# Patient Record
Sex: FEMALE | Race: WHITE | NOT HISPANIC OR LATINO | Employment: OTHER | URBAN - METROPOLITAN AREA
[De-identification: names, ages, dates, MRNs, and addresses within clinical notes are randomized per-mention and may not be internally consistent; named-entity substitution may affect disease eponyms.]

---

## 2017-03-14 ENCOUNTER — PROCEDURE VISIT (OUTPATIENT)
Dept: OBSTETRICS AND GYNECOLOGY | Facility: CLINIC | Age: 72
End: 2017-03-14

## 2017-03-14 ENCOUNTER — OFFICE VISIT (OUTPATIENT)
Dept: OBSTETRICS AND GYNECOLOGY | Facility: CLINIC | Age: 72
End: 2017-03-14

## 2017-03-14 VITALS
HEIGHT: 63 IN | DIASTOLIC BLOOD PRESSURE: 70 MMHG | SYSTOLIC BLOOD PRESSURE: 118 MMHG | WEIGHT: 141 LBS | BODY MASS INDEX: 24.98 KG/M2

## 2017-03-14 DIAGNOSIS — Z01.419 WELL WOMAN EXAM WITH ROUTINE GYNECOLOGICAL EXAM: Primary | ICD-10-CM

## 2017-03-14 DIAGNOSIS — Z78.0 POSTMENOPAUSAL: ICD-10-CM

## 2017-03-14 DIAGNOSIS — M85.80 OSTEOPENIA: ICD-10-CM

## 2017-03-14 DIAGNOSIS — Z13.820 SCREENING FOR OSTEOPOROSIS: Primary | ICD-10-CM

## 2017-03-14 PROCEDURE — G0101 CA SCREEN;PELVIC/BREAST EXAM: HCPCS | Performed by: OBSTETRICS & GYNECOLOGY

## 2017-03-14 PROCEDURE — 77080 DXA BONE DENSITY AXIAL: CPT | Performed by: OBSTETRICS & GYNECOLOGY

## 2017-03-14 RX ORDER — UBIDECARENONE 60 MG
CAPSULE ORAL
COMMUNITY

## 2017-03-14 RX ORDER — CETIRIZINE HYDROCHLORIDE 10 MG/1
10 TABLET ORAL
COMMUNITY

## 2017-03-14 RX ORDER — CHOLECALCIFEROL (VITAMIN D3) 25 MCG
TABLET,CHEWABLE ORAL
COMMUNITY

## 2017-03-14 NOTE — PROGRESS NOTES
Subjective   Bhavana Carcamo is a 72 y.o. female is here today as a self referral. Here for annual.    History of Present Illness-here today for annual exam and checkup.    The following portions of the patient's history were reviewed and updated as appropriate: allergies, current medications, past family history, past medical history, past social history, past surgical history and problem list.    Review of Systems   Constitutional: Negative.    HENT: Negative.    Eyes: Negative.    Respiratory: Negative.    Cardiovascular: Negative.    Gastrointestinal: Negative.    Endocrine: Negative.    Genitourinary: Negative.    Musculoskeletal: Negative.    Skin: Negative.    Allergic/Immunologic: Negative.    Neurological: Negative.    Hematological: Negative.    Psychiatric/Behavioral: Negative.        Objective   Physical Exam   Constitutional: She is oriented to person, place, and time. She appears well-developed and well-nourished.   HENT:   Head: Normocephalic and atraumatic.   Nose: Nose normal.   Eyes: Conjunctivae and EOM are normal. Pupils are equal, round, and reactive to light.   Neck: Normal range of motion. Neck supple. No thyromegaly present.   Cardiovascular: Normal rate, regular rhythm, normal heart sounds and intact distal pulses.  Exam reveals no gallop.    No murmur heard.  Pulmonary/Chest: Effort normal and breath sounds normal. No respiratory distress. She has no wheezes. She exhibits no mass, no tenderness, no swelling and no retraction. Right breast exhibits no inverted nipple, no mass, no nipple discharge, no skin change and no tenderness. Left breast exhibits no inverted nipple, no mass, no nipple discharge, no skin change and no tenderness.   Abdominal: Soft. Bowel sounds are normal. She exhibits no distension and no mass. There is no tenderness.   Genitourinary: Rectum normal, vagina normal and uterus normal. There is no rash, tenderness, lesion or injury on the right labia. There is no rash,  tenderness, lesion or injury on the left labia. Uterus is not enlarged and not tender. Cervix exhibits no motion tenderness and no discharge. Right adnexum displays no mass, no tenderness and no fullness. Left adnexum displays no mass, no tenderness and no fullness.   Musculoskeletal: Normal range of motion. She exhibits no edema, tenderness or deformity.   Neurological: She is alert and oriented to person, place, and time.   Skin: Skin is warm and dry.   Psychiatric: She has a normal mood and affect. Her behavior is normal. Judgment and thought content normal.   Nursing note and vitals reviewed.        Assessment/Plan   Problems Addressed this Visit     None      Visit Diagnoses     Well woman exam with routine gynecological exam    -  Primary        Mammogram today and DEXA scan with -1.9 on her hip.  Patient has used steroids in the past for injections for her left knee.  Colonoscopy is current.

## 2018-09-11 ENCOUNTER — OFFICE VISIT (OUTPATIENT)
Dept: GASTROENTEROLOGY | Facility: CLINIC | Age: 73
End: 2018-09-11

## 2018-09-11 VITALS — HEIGHT: 63 IN | HEART RATE: 69 BPM | SYSTOLIC BLOOD PRESSURE: 100 MMHG | DIASTOLIC BLOOD PRESSURE: 75 MMHG

## 2018-09-11 DIAGNOSIS — R10.13 DYSPEPSIA: ICD-10-CM

## 2018-09-11 DIAGNOSIS — D12.6 ADENOMATOUS POLYP OF COLON, UNSPECIFIED PART OF COLON: ICD-10-CM

## 2018-09-11 DIAGNOSIS — Z83.71 FAMILY HISTORY OF POLYPS IN THE COLON: ICD-10-CM

## 2018-09-11 DIAGNOSIS — K21.9 GASTROESOPHAGEAL REFLUX DISEASE, ESOPHAGITIS PRESENCE NOT SPECIFIED: Primary | ICD-10-CM

## 2018-09-11 PROCEDURE — 99214 OFFICE O/P EST MOD 30 MIN: CPT | Performed by: INTERNAL MEDICINE

## 2018-09-11 NOTE — PROGRESS NOTES
PATIENT INFORMATION  Bhavana Carcamo       - 1945    CHIEF COMPLAINT  Chief Complaint   Patient presents with   • Heartburn       HISTORY OF PRESENT ILLNESS  Has been on nexium for years and then did well off and but resarted  Omeprazole but tried to wean down but is replacing it with Beano.   No previous EGD but does better with PPI. Istaking 20mgonce aday with the Nexium            REVIEW OF SYSTEMS  Review of Systems   Gastrointestinal:        Reflux   All other systems reviewed and are negative.        ACTIVE PROBLEMS  Patient Active Problem List    Diagnosis   • Anxiety [F41.9]   • Allergy [T78.40XA]   • GERD (gastroesophageal reflux disease) [K21.9]   • Hip pain [M25.559]   • Hyperlipidemia [E78.5]   • Knee pain [M25.569]   • Low back pain [M54.5]     Overview Note:     right     • Neck pain [M54.2]   • Urinary frequency [R35.0]         PAST MEDICAL HISTORY  Past Medical History:   Diagnosis Date   • Allergy    • Colon polyp    • GERD (gastroesophageal reflux disease)    • H/O complete eye exam DUE   • Hip pain    • Hyperlipidemia    • Knee pain    • Low back pain     right   • Neck pain    • Osteoarthritis    • Scoliosis (and kyphoscoliosis), idiopathic    • Urinary frequency          SURGICAL HISTORY  Past Surgical History:   Procedure Laterality Date   • COLONOSCOPY  10/2010   • KNEE SURGERY  2012   • MAMMO BILATERAL  2014    dr odom    • PAP SMEAR  2014    dr odom    • TONSILLECTOMY     • TUBAL ABDOMINAL LIGATION           FAMILY HISTORY  Family History   Problem Relation Age of Onset   • Parkinsonism Maternal Grandfather    • Cancer Other         aunt-breast   • Breast cancer Paternal Aunt    • Colon cancer Neg Hx    • Colon polyps Neg Hx          SOCIAL HISTORY  Social History     Occupational History   • Not on file.     Social History Main Topics   • Smoking status: Never Smoker   • Smokeless tobacco: Not on file   • Alcohol use Yes      Comment: occasional   • Drug use:  "Unknown   • Sexual activity: Not on file         CURRENT MEDICATIONS    Current Outpatient Prescriptions:   •  ALPRAZolam (XANAX) 0.25 MG tablet, Take 1 tablet by mouth daily as needed for anxiety., Disp: 20 tablet, Rfl: 0  •  ascorbic acid with jose luis hips 1000 MG tablet, Take 1,000 mg by mouth., Disp: , Rfl:   •  cetirizine (ZyrTEC) 10 MG tablet, Take 10 mg by mouth daily., Disp: , Rfl:   •  cetirizine (zyrTEC) 10 MG tablet, Take 10 mg by mouth., Disp: , Rfl:   •  Coenzyme Q10 60 MG capsule, Take  by mouth., Disp: , Rfl:   •  Cyanocobalamin (B-12) 1000 MCG capsule, Take  by mouth., Disp: , Rfl:   •  Fish Oil-Cholecalciferol (FISH OIL + D3) 6694-8572 MG-UNIT capsule, Take 2 g by mouth., Disp: , Rfl:   •  Multiple Vitamin (MULTIVITAMIN) tablet, Take 1 tablet by mouth daily., Disp: , Rfl:   •  MULTIPLE VITAMINS-MINERALS ER PO, Take  by mouth., Disp: , Rfl:   •  PROBIOTIC PRODUCT PO, Take  by mouth., Disp: , Rfl:   •  Vitamin C-Vitamin E-Panthenol (VITAMIN E & C BEAUTY LOTION EX), Take 800 Units by mouth., Disp: , Rfl:   •  VITAMIN K, PHYTONADIONE, PO, Take 100 mcg by mouth., Disp: , Rfl:     ALLERGIES  Patient has no known allergies.    VITALS  Vitals:    09/11/18 1113   BP: 100/75   Pulse: 69   Height: 160 cm (62.99\")       LAST RESULTS   Abstract on 12/14/2015   Component Date Value Ref Range Status   •  Mammogram 01/01/2013 results unavailable   Final   •  Pap smear 01/01/2013 results unavailable   Final   •  Colonoscopy 10/15/2010 polyps   Final   •  EYE EXAM 01/01/2012 with dilation   Final   •  Dexa Scan 01/01/2013 results unavailable   Final     No results found.    PHYSICAL EXAM  Physical Exam   Constitutional: She is oriented to person, place, and time. She appears well-developed and well-nourished.   HENT:   Head: Normocephalic and atraumatic.   Eyes: Pupils are equal, round, and reactive to light. Conjunctivae and EOM are normal. No scleral icterus.   Neck: Normal range of motion. Neck supple. No " thyromegaly present.   Cardiovascular: Normal rate, regular rhythm, normal heart sounds and intact distal pulses.  Exam reveals no gallop.    No murmur heard.  Pulmonary/Chest: Effort normal and breath sounds normal. She has no wheezes. She has no rales.   Abdominal: Soft. Bowel sounds are normal. She exhibits no shifting dullness, no distension, no fluid wave, no abdominal bruit, no ascites and no mass. There is no hepatosplenomegaly. There is tenderness in the right upper quadrant, right lower quadrant, epigastric area and periumbilical area. There is no guarding and negative Clark's sign. Hernia confirmed negative in the ventral area.   Musculoskeletal: Normal range of motion. She exhibits no edema.   Lymphadenopathy:     She has no cervical adenopathy.   Neurological: She is alert and oriented to person, place, and time.   Skin: Skin is warm and dry. No rash noted. She is not diaphoretic. No erythema.   Psychiatric: She has a normal mood and affect.       ASSESSMENT  Diagnoses and all orders for this visit:    Gastroesophageal reflux disease, esophagitis presence not specified  -     External Facility Surgical / Procedural Request; Future    Dyspepsia  -     External Facility Surgical / Procedural Request; Future    Family history of polyps in the colon    Adenomatous polyp of colon, unspecified part of colon  -     External Facility Surgical / Procedural Request; Future    Other orders  -     Obtain informed consent; Future          PLAN  Return in about 3 months (around 12/11/2018).

## 2018-09-20 ENCOUNTER — TELEPHONE (OUTPATIENT)
Dept: GASTROENTEROLOGY | Facility: CLINIC | Age: 73
End: 2018-09-20

## 2018-09-20 NOTE — TELEPHONE ENCOUNTER
----- Message from Jaun Vila MA sent at 9/11/2018 11:46 AM EDT -----  Regarding: egd & c/s  franciscoasse call and schedule double @ [facility]

## 2018-10-22 ENCOUNTER — OUTSIDE FACILITY SERVICE (OUTPATIENT)
Dept: GASTROENTEROLOGY | Facility: CLINIC | Age: 73
End: 2018-10-22

## 2018-10-22 ENCOUNTER — LAB REQUISITION (OUTPATIENT)
Dept: LAB | Facility: HOSPITAL | Age: 73
End: 2018-10-22

## 2018-10-22 DIAGNOSIS — R10.13 EPIGASTRIC PAIN: ICD-10-CM

## 2018-10-22 PROCEDURE — 45380 COLONOSCOPY AND BIOPSY: CPT | Performed by: INTERNAL MEDICINE

## 2018-10-22 PROCEDURE — 88305 TISSUE EXAM BY PATHOLOGIST: CPT | Performed by: INTERNAL MEDICINE

## 2018-10-22 PROCEDURE — 88312 SPECIAL STAINS GROUP 1: CPT | Performed by: INTERNAL MEDICINE

## 2018-10-22 PROCEDURE — 45385 COLONOSCOPY W/LESION REMOVAL: CPT | Performed by: INTERNAL MEDICINE

## 2018-10-22 PROCEDURE — 43239 EGD BIOPSY SINGLE/MULTIPLE: CPT | Performed by: INTERNAL MEDICINE

## 2018-10-23 LAB
CYTO UR: NORMAL
LAB AP CASE REPORT: NORMAL
LAB AP CLINICAL INFORMATION: NORMAL
PATH REPORT.FINAL DX SPEC: NORMAL
PATH REPORT.GROSS SPEC: NORMAL

## 2019-03-19 ENCOUNTER — PROCEDURE VISIT (OUTPATIENT)
Dept: OBSTETRICS AND GYNECOLOGY | Facility: CLINIC | Age: 74
End: 2019-03-19

## 2019-03-19 ENCOUNTER — APPOINTMENT (OUTPATIENT)
Dept: WOMENS IMAGING | Facility: HOSPITAL | Age: 74
End: 2019-03-19

## 2019-03-19 DIAGNOSIS — Z78.0 POST-MENOPAUSAL: Primary | ICD-10-CM

## 2019-03-19 DIAGNOSIS — Z12.31 VISIT FOR SCREENING MAMMOGRAM: Primary | ICD-10-CM

## 2019-03-19 PROCEDURE — 77067 SCR MAMMO BI INCL CAD: CPT | Performed by: OBSTETRICS & GYNECOLOGY

## 2019-03-19 PROCEDURE — 77080 DXA BONE DENSITY AXIAL: CPT | Performed by: OBSTETRICS & GYNECOLOGY

## 2019-03-19 PROCEDURE — 77067 SCR MAMMO BI INCL CAD: CPT | Performed by: RADIOLOGY

## 2020-01-14 ENCOUNTER — LAB REQUISITION (OUTPATIENT)
Dept: LAB | Facility: HOSPITAL | Age: 75
End: 2020-01-14

## 2020-01-14 DIAGNOSIS — Z00.00 ENCOUNTER FOR GENERAL ADULT MEDICAL EXAMINATION WITHOUT ABNORMAL FINDINGS: ICD-10-CM

## 2020-01-14 PROCEDURE — 87070 CULTURE OTHR SPECIMN AEROBIC: CPT | Performed by: SURGERY

## 2020-01-14 PROCEDURE — 87205 SMEAR GRAM STAIN: CPT | Performed by: SURGERY

## 2020-01-17 LAB
BACTERIA SPEC AEROBE CULT: NORMAL
GRAM STN SPEC: NORMAL
GRAM STN SPEC: NORMAL

## 2021-04-12 ENCOUNTER — TELEPHONE (OUTPATIENT)
Dept: GASTROENTEROLOGY | Facility: CLINIC | Age: 76
End: 2021-04-12

## 2021-04-15 ENCOUNTER — PREP FOR SURGERY (OUTPATIENT)
Dept: SURGERY | Facility: SURGERY CENTER | Age: 76
End: 2021-04-15

## 2021-04-15 DIAGNOSIS — K55.20 CECAL ANGIODYSPLASIA: ICD-10-CM

## 2021-04-15 DIAGNOSIS — Z86.010 PERSONAL HISTORY OF COLONIC POLYPS: ICD-10-CM

## 2021-04-15 DIAGNOSIS — Z12.11 ENCOUNTER FOR SCREENING FOR MALIGNANT NEOPLASM OF COLON: Primary | ICD-10-CM

## 2021-04-15 NOTE — TELEPHONE ENCOUNTER
Colonoscopy [place] ordered,  Will you please add request for APC for procedure pt has cecal angiodysplasia.  Thanks

## 2021-04-16 ENCOUNTER — TRANSCRIBE ORDERS (OUTPATIENT)
Dept: LAB | Facility: SURGERY CENTER | Age: 76
End: 2021-04-16

## 2021-04-16 DIAGNOSIS — Z01.818 OTHER SPECIFIED PRE-OPERATIVE EXAMINATION: Primary | ICD-10-CM

## 2021-04-16 PROBLEM — Z86.010 PERSONAL HISTORY OF COLONIC POLYPS: Status: ACTIVE | Noted: 2021-04-16

## 2021-04-16 PROBLEM — Z86.0100 PERSONAL HISTORY OF COLONIC POLYPS: Status: ACTIVE | Noted: 2021-04-16

## 2021-04-16 PROBLEM — Z12.11 ENCOUNTER FOR SCREENING FOR MALIGNANT NEOPLASM OF COLON: Status: ACTIVE | Noted: 2021-04-16

## 2021-04-16 NOTE — TELEPHONE ENCOUNTER
CALLED AND SPOKE WITH PATIENT.  SCHEDULED AT [facility] ON 10/26/2021 AT 12:30PM - ARRIVE 11:30AM.  WILL MAIL INSTRUCTIONS WITH DIRECTIONS.    COVID TEST ON 10/23/2021 ARRIVE 8AM AT [facility].  NEED TO SELF QUARANTINE UNTIL AFTER PROCEDURE.  SHE UNDERSTANDS.

## 2021-10-19 DIAGNOSIS — Z01.818 OTHER SPECIFIED PRE-OPERATIVE EXAMINATION: Primary | ICD-10-CM

## 2021-10-21 RX ORDER — FLUOXETINE 10 MG/1
10 CAPSULE ORAL DAILY
COMMUNITY

## 2021-10-26 ENCOUNTER — HOSPITAL ENCOUNTER (OUTPATIENT)
Facility: SURGERY CENTER | Age: 76
Setting detail: HOSPITAL OUTPATIENT SURGERY
Discharge: HOME | End: 2021-10-26
Attending: INTERNAL MEDICINE | Admitting: INTERNAL MEDICINE

## 2021-10-26 ENCOUNTER — ANESTHESIA EVENT (OUTPATIENT)
Dept: SURGERY | Facility: SURGERY CENTER | Age: 76
End: 2021-10-26

## 2021-10-26 ENCOUNTER — ANESTHESIA (OUTPATIENT)
Dept: SURGERY | Facility: SURGERY CENTER | Age: 76
End: 2021-10-26

## 2021-10-26 VITALS
HEART RATE: 61 BPM | BODY MASS INDEX: 27.11 KG/M2 | OXYGEN SATURATION: 99 % | TEMPERATURE: 98.7 F | HEIGHT: 63 IN | RESPIRATION RATE: 16 BRPM | DIASTOLIC BLOOD PRESSURE: 68 MMHG | SYSTOLIC BLOOD PRESSURE: 113 MMHG | WEIGHT: 153 LBS

## 2021-10-26 DIAGNOSIS — Z86.010 PERSONAL HISTORY OF COLONIC POLYPS: ICD-10-CM

## 2021-10-26 DIAGNOSIS — Z12.11 ENCOUNTER FOR SCREENING FOR MALIGNANT NEOPLASM OF COLON: ICD-10-CM

## 2021-10-26 PROCEDURE — 45380 COLONOSCOPY AND BIOPSY: CPT | Performed by: INTERNAL MEDICINE

## 2021-10-26 PROCEDURE — 0DBK8ZX EXCISION OF ASCENDING COLON, VIA NATURAL OR ARTIFICIAL OPENING ENDOSCOPIC, DIAGNOSTIC: ICD-10-PCS | Performed by: INTERNAL MEDICINE

## 2021-10-26 PROCEDURE — 25010000002 PROPOFOL 10 MG/ML EMULSION: Performed by: ANESTHESIOLOGY

## 2021-10-26 PROCEDURE — 88305 TISSUE EXAM BY PATHOLOGIST: CPT | Performed by: INTERNAL MEDICINE

## 2021-10-26 RX ORDER — PROPOFOL 10 MG/ML
VIAL (ML) INTRAVENOUS AS NEEDED
Status: DISCONTINUED | OUTPATIENT
Start: 2021-10-26 | End: 2021-10-26 | Stop reason: SURG

## 2021-10-26 RX ORDER — FOLIC ACID 1 MG/1
1 TABLET ORAL DAILY
COMMUNITY

## 2021-10-26 RX ORDER — SODIUM CHLORIDE, SODIUM LACTATE, POTASSIUM CHLORIDE, CALCIUM CHLORIDE 600; 310; 30; 20 MG/100ML; MG/100ML; MG/100ML; MG/100ML
1000 INJECTION, SOLUTION INTRAVENOUS CONTINUOUS
Status: DISCONTINUED | OUTPATIENT
Start: 2021-10-26 | End: 2021-10-26 | Stop reason: HOSPADM

## 2021-10-26 RX ORDER — LIDOCAINE HYDROCHLORIDE 10 MG/ML
0.5 INJECTION, SOLUTION INFILTRATION; PERINEURAL ONCE AS NEEDED
Status: DISCONTINUED | OUTPATIENT
Start: 2021-10-26 | End: 2021-10-26 | Stop reason: HOSPADM

## 2021-10-26 RX ORDER — LIDOCAINE HYDROCHLORIDE 20 MG/ML
INJECTION, SOLUTION INFILTRATION; PERINEURAL AS NEEDED
Status: DISCONTINUED | OUTPATIENT
Start: 2021-10-26 | End: 2021-10-26 | Stop reason: SURG

## 2021-10-26 RX ADMIN — PROPOFOL 120 MG: 10 INJECTION, EMULSION INTRAVENOUS at 13:34

## 2021-10-26 RX ADMIN — PROPOFOL 200 MCG/KG/MIN: 10 INJECTION, EMULSION INTRAVENOUS at 13:34

## 2021-10-26 RX ADMIN — LIDOCAINE HYDROCHLORIDE 60 MG: 20 INJECTION, SOLUTION INFILTRATION; PERINEURAL at 13:34

## 2021-10-26 RX ADMIN — SODIUM CHLORIDE, POTASSIUM CHLORIDE, SODIUM LACTATE AND CALCIUM CHLORIDE 1000 ML: 600; 310; 30; 20 INJECTION, SOLUTION INTRAVENOUS at 12:04

## 2021-10-26 NOTE — H&P
Patient Care Team:  Sherwin Pitts MD as PCP - General (Internal Medicine)    CHIEF COMPLAINT: Personal hx colon polyps    HISTORY OF PRESENT ILLNESS:  Last exam was 2018    Past Medical History:   Diagnosis Date   • Allergy    • Colon polyp    • GERD (gastroesophageal reflux disease)    • H/O complete eye exam DUE   • Hip pain    • Hyperlipidemia    • Knee pain    • Low back pain     right   • Neck pain    • Osteoarthritis    • Scoliosis (and kyphoscoliosis), idiopathic    • Urinary frequency      Past Surgical History:   Procedure Laterality Date   • COLONOSCOPY  10/2010   • JOINT REPLACEMENT Left     Knee 2 times   • KNEE SURGERY  02/2012   • MAMMO BILATERAL  06/2014    dr odom    • PAP SMEAR  06/2014    dr odom    • TONSILLECTOMY     • TOTAL HIP ARTHROPLASTY Bilateral    • TUBAL ABDOMINAL LIGATION       Family History   Problem Relation Age of Onset   • Parkinsonism Maternal Grandfather    • Cancer Other         aunt-breast   • Breast cancer Paternal Aunt    • Colon cancer Neg Hx    • Colon polyps Neg Hx      Social History     Tobacco Use   • Smoking status: Never Smoker   • Smokeless tobacco: Not on file   Substance Use Topics   • Alcohol use: Yes     Comment: occasional   • Drug use: Not on file     Medications Prior to Admission   Medication Sig Dispense Refill Last Dose   • ascorbic acid with jose luis hips 1000 MG tablet Take 1,000 mg by mouth.   10/25/2021 at Unknown time   • cetirizine (ZyrTEC) 10 MG tablet Take 10 mg by mouth daily.   10/25/2021 at Unknown time   • Coenzyme Q10 60 MG capsule Take  by mouth.   10/25/2021 at Unknown time   • Cyanocobalamin (B-12) 1000 MCG capsule Take  by mouth.   10/25/2021 at Unknown time   • Fish Oil-Cholecalciferol (FISH OIL + D3) 9547-7755 MG-UNIT capsule Take 2 g by mouth.   10/25/2021 at Unknown time   • FLUoxetine (PROzac) 10 MG capsule Take 10 mg by mouth Daily.   10/25/2021 at Unknown time   • folic acid (FOLVITE) 1 MG tablet Take 1 mg by mouth Daily.    "10/25/2021 at Unknown time   • Multiple Vitamin (MULTIVITAMIN) tablet Take 1 tablet by mouth daily.   10/25/2021 at Unknown time   • PROBIOTIC PRODUCT PO Take  by mouth.   10/25/2021 at Unknown time   • TURMERIC PO Take  by mouth Daily.   10/25/2021 at Unknown time   • Vitamin C-Vitamin E-Panthenol (VITAMIN E & C BEAUTY LOTION EX) Take 800 Units by mouth.   10/25/2021 at Unknown time   • VITAMIN K, PHYTONADIONE, PO Take 100 mcg by mouth.   10/25/2021 at Unknown time   • ALPRAZolam (XANAX) 0.25 MG tablet Take 1 tablet by mouth daily as needed for anxiety. 20 tablet 0    • cetirizine (zyrTEC) 10 MG tablet Take 10 mg by mouth.      • MULTIPLE VITAMINS-MINERALS ER PO Take  by mouth.        Allergies:  Patient has no known allergies.    REVIEW OF SYSTEMS:  Please see the above history of present illness for pertinent positives and negatives.  The remainder of the patient's systems have been reviewed and are negative.     Vital Signs  Temp:  [97.1 °F (36.2 °C)] 97.1 °F (36.2 °C)  Heart Rate:  [64] 64  Resp:  [16] 16  BP: (131)/(68) 131/68    Flowsheet Rows      First Filed Value   Admission Height 160 cm (63\") Documented at 10/21/2021 1150   Admission Weight 68 kg (150 lb) Documented at 10/21/2021 1150           Physical Exam:  Physical Exam   Constitutional: Patient appears well-developed and well-nourished and in no acute distress   HEENT:   Head: Normocephalic and atraumatic.   Eyes:  Pupils are equal, round, and reactive to light. EOM are intact. Sclerae are anicteric and non-injected.  Mouth and Throat: Patient has moist mucous membranes. Oropharynx is clear of any erythema or exudate.     Neck: Neck supple. No JVD present. No thyromegaly present. No lymphadenopathy present.  Cardiovascular: Regular rate, regular rhythm, S1 normal and S2 normal.  Exam reveals no gallop and no friction rub.  No murmur heard.  Pulmonary/Chest: Lungs are clear to auscultation bilaterally. No respiratory distress. No wheezes. No rhonchi. " No rales.   Abdominal: Soft. Bowel sounds are normal. No distension and no mass. There is no hepatosplenomegaly. There is no tenderness.   Musculoskeletal: Normal Muscle tone  Extremities: No edema. Pulses are palpable in all 4 extremities.  Neurological: Patient is alert and oriented to person, place, and time. Cranial nerves II-XII are grossly intact with no focal deficits.  Skin: Skin is warm. No rash noted. Nails show no clubbing.  No cyanosis or erythema.    Debilities/Disabilities Identified: None  Emotional Behavior: Appropriate     Results Review:   I reviewed the patient's new clinical results.    Lab Results (most recent)     None          Imaging Results (Most Recent)     None        reviewed    ECG/EMG Results (most recent)     None        reviewed    Assessment/Plan   Personal hx colon polyps/  colonoscopy      I discussed the patient's findings and my recommendations with patient.     Solo Gutierrez MD  10/26/21  13:00 EDT    Time: 10 min prior to procedure.

## 2021-10-26 NOTE — ANESTHESIA PREPROCEDURE EVALUATION
Anesthesia Evaluation     Patient summary reviewed   no history of anesthetic complications:  NPO Solid Status: > 8 hours  NPO Liquid Status: > 2 hours           Airway   Mallampati: II  TM distance: >3 FB  Neck ROM: full  No difficulty expected  Dental      Pulmonary     breath sounds clear to auscultation  (-) shortness of breath, recent URI, not a smoker  Cardiovascular     Rhythm: regular  Rate: normal    (+) hyperlipidemia,   (-) past MI, dysrhythmias, angina      Neuro/Psych  (+) psychiatric history Anxiety,     (-) seizures, CVA  GI/Hepatic/Renal/Endo    (+)  GERD,    (-)  obesity, liver disease, no renal disease, diabetes    Musculoskeletal     (-) neck stiffness  Abdominal    Substance History      OB/GYN          Other   arthritis,    history of cancer (CLL)                  Anesthesia Plan    ASA 2     MAC       Anesthetic plan, all risks, benefits, and alternatives have been provided, discussed and informed consent has been obtained with: patient.

## 2021-10-26 NOTE — DISCHARGE INSTRUCTIONS
Colonoscopy, Adult, Care After  This sheet gives you information about how to care for yourself after your procedure. Your doctor may also give you more specific instructions. If you have problems or questions, call your doctor.  What can I expect after the procedure?  After the procedure, it is common to have:  · A small amount of blood in your poop (stool) for 24 hours.  · Some gas.  · Mild cramping or bloating in your belly (abdomen).  Follow these instructions at home:  Eating and drinking    · Drink enough fluid to keep your pee (urine) pale yellow.  · Follow instructions from your doctor about what you cannot eat or drink.  · Return to your normal diet as told by your doctor. Avoid heavy or fried foods that are hard to digest.    Activity  · Rest as told by your doctor.  · Do not sit for a long time without moving. Get up to take short walks every 1-2 hours. This is important. Ask for help if you feel weak or unsteady.  · Return to your normal activities as told by your doctor. Ask your doctor what activities are safe for you.  To help cramping and bloating:    · Try walking around.  · Put heat on your belly as told by your doctor. Use the heat source that your doctor recommends, such as a moist heat pack or a heating pad.  ? Put a towel between your skin and the heat source.  ? Leave the heat on for 20-30 minutes.  ? Remove the heat if your skin turns bright red. This is very important if you are unable to feel pain, heat, or cold. You may have a greater risk of getting burned.    General instructions  · If you were given a medicine to help you relax (sedative) during your procedure, it can affect you for many hours. Do not drive or use machinery until your doctor says that it is safe.  · For the first 24 hours after the procedure:  ? Do not sign important documents.  ? Do not drink alcohol.  ? Do your daily activities more slowly than normal.  ? Eat foods that are soft and easy to digest.  · Take  over-the-counter or prescription medicines only as told by your doctor.  · Keep all follow-up visits as told by your doctor. This is important.  Contact a doctor if:  · You have blood in your poop 2-3 days after the procedure.  Get help right away if:  · You have more than a small amount of blood in your poop.  · You see large clumps of tissue (blood clots) in your poop.  · Your belly is swollen.  · You feel like you may vomit (nauseous).  · You vomit.  · You have a fever.  · You have belly pain that gets worse, and medicine does not help your pain.  Summary  · After the procedure, it is common to have a small amount of blood in your poop. You may also have mild cramping and bloating in your belly.  · If you were given a medicine to help you relax (sedative) during your procedure, it can affect you for many hours. Do not drive or use machinery until your doctor says that it is safe.  · Get help right away if you have a lot of blood in your poop, feel like you may vomit, have a fever, or have more belly pain.  This information is not intended to replace advice given to you by your health care provider. Make sure you discuss any questions you have with your health care provider.  Document Revised: 10/23/2020 Document Reviewed: 07/13/2020  [employer] Patient Education © 2021 [employer]  Monitored Anesthesia Care, Care After  This sheet gives you information about how to care for yourself after your procedure. Your health care provider may also give you more specific instructions. If you have problems or questions, contact your health care provider.  What can I expect after the procedure?  After the procedure, it is common to have:  · Tiredness.  · Forgetfulness about what happened after the procedure.  · Impaired judgment for important decisions.  · Nausea or vomiting.  · Some difficulty with balance.  Follow these instructions at home:  For the time period you were told by your health care provider:         · Rest as  needed.  · Do not participate in activities where you could fall or become injured.  · Do not drive or use machinery.  · Do not drink alcohol.  · Do not take sleeping pills or medicines that cause drowsiness.  · Do not make important decisions or sign legal documents.  · Do not take care of children on your own.  Eating and drinking  · Follow the diet that is recommended by your health care provider.  · Drink enough fluid to keep your urine pale yellow.  · If you vomit:  ? Drink water, juice, or soup when you can drink without vomiting.  ? Make sure you have little or no nausea before eating solid foods.  General instructions  · Have a responsible adult stay with you for the time you are told. It is important to have someone help care for you until you are awake and alert.  · Take over-the-counter and prescription medicines only as told by your health care provider.  · If you have sleep apnea, surgery and certain medicines can increase your risk for breathing problems. Follow instructions from your health care provider about wearing your sleep device:  ? Anytime you are sleeping, including during daytime naps.  ? While taking prescription pain medicines, sleeping medicines, or medicines that make you drowsy.  · Avoid smoking.  · Keep all follow-up visits as told by your health care provider. This is important.  Contact a health care provider if:  · You keep feeling nauseous or you keep vomiting.  · You feel light-headed.  · You are still sleepy or having trouble with balance after 24 hours.  · You develop a rash.  · You have a fever.  · You have redness or swelling around the IV site.  Get help right away if:  · You have trouble breathing.  · You have new-onset confusion at home.  Summary  · For several hours after your procedure, you may feel tired. You may also be forgetful and have poor judgment.  · Have a responsible adult stay with you for the time you are told. It is important to have someone help care for you  until you are awake and alert.  · Rest as told. Do not drive or operate machinery. Do not drink alcohol or take sleeping pills.  · Get help right away if you have trouble breathing, or if you suddenly become confused.  This information is not intended to replace advice given to you by your health care provider. Make sure you discuss any questions you have with your health care provider.  Document Revised: 04/23/2021 Document Reviewed: 11/19/2020  [employer] Patient Education © 2021 [employer]

## 2021-10-26 NOTE — BRIEF OP NOTE
COLONOSCOPY  Progress Note    Bhavana Carcamo  10/26/2021    Pre-op Diagnosis:   Encounter for screening for malignant neoplasm of colon [Z12.11]  Personal history of colonic polyps [Z86.010]       Post-Op Diagnosis Codes:     * Encounter for screening for malignant neoplasm of colon [Z12.11]     * Personal history of colonic polyps [Z86.010]     * Colon polyp [K63.5]    Procedure/CPT® Codes:        Procedure(s):  COLONOSCOPY    Surgeon(s):  Solo Gutierrez MD    Anesthesia: Monitored Anesthesia Care    Staff:   Endo Technician: Bebe Shaw  Endo Nurse: Tracey Snow RN         Estimated Blood Loss: none    Urine Voided: * No values recorded between 10/26/2021  1:29 PM and 10/26/2021  1:57 PM *    Specimens:                Specimens     ID Source Type Tests Collected By Collected At Frozen?    A Large Intestine, Sigmoid Colon Tissue · TISSUE PATHOLOGY EXAM   Sool Gutierrez MD 10/26/21 1341 No    Description: sigmoid polyp x2                Drains: * No LDAs found *    Findings: Colon to Cecum good Prep  [id number]    Complications: None          Solo Gutierrez MD     Date: 10/26/2021  Time: 13:56 EDT

## 2021-10-28 LAB
LAB AP CASE REPORT: NORMAL
PATH REPORT.FINAL DX SPEC: NORMAL
PATH REPORT.GROSS SPEC: NORMAL

## 2021-11-04 ENCOUNTER — TELEPHONE (OUTPATIENT)
Dept: GASTROENTEROLOGY | Facility: CLINIC | Age: 76
End: 2021-11-04

## 2021-11-04 NOTE — TELEPHONE ENCOUNTER
Patient LM on office VM inquiring about results of colonoscopy pathology from 10/26/21.  Per patient's permission, I LM on patient VM that 2 benign polyps were removed, and that Dr. Gutierrez recommends repeating her colonoscopy in 5 years.  Advised that we would be sending her a reminder in 5 years to schedule this.

## 2022-07-18 NOTE — ANESTHESIA POSTPROCEDURE EVALUATION
"Patient: Bhavana Carcamo    Procedure Summary     Date: 10/26/21 Room / Location: [facility]    Anesthesia Start: 1328 Anesthesia Stop: 1359    Procedure: COLONOSCOPY (N/A ) Diagnosis:       Encounter for screening for malignant neoplasm of colon      Personal history of colonic polyps      Colon polyp      (Encounter for screening for malignant neoplasm of colon [Z12.11])      (Personal history of colonic polyps [Z86.010])    Surgeons: Solo Gutierrez MD Provider: Bryan Aburto DO    Anesthesia Type: MAC ASA Status: 2          Anesthesia Type: MAC    Vitals  Vitals Value Taken Time   BP 98/58 10/26/21 1358   Temp 37.1 °C (98.7 °F) 10/26/21 1358   Pulse 60 10/26/21 1358   Resp 16 10/26/21 1358   SpO2 99 % 10/26/21 1358           Post Anesthesia Care and Evaluation    Patient location during evaluation: bedside  Patient participation: waiting for patient participation  Level of consciousness: sleepy but conscious and responsive to verbal stimuli  Pain management: adequate  Airway patency: patent  Anesthetic complications: No anesthetic complications  PONV Status: NA  Cardiovascular status: acceptable and hemodynamically stable  Respiratory status: acceptable, spontaneous ventilation and nonlabored ventilation  Hydration status: acceptable    Comments: BP 98/58 (BP Location: Left arm, Patient Position: Lying)   Pulse 60   Temp 37.1 °C (98.7 °F) (Infrared)   Resp 16   Ht 160 cm (63\")   Wt 69.4 kg (153 lb)   SpO2 99%   BMI 27.10 kg/m²       " Problem: Pain  Goal: Verbalizes/displays adequate comfort level or baseline comfort level  Outcome: Progressing Towards Goal  Note: Pt will be satisfied with pain control. Pt uses numeric pain rating scale with reassessments after pain med administration. Will continue to monitor progression throughout shift. Problem: Safety - Adult  Goal: Free from fall injury  Outcome: Progressing Towards Goal  Note: Pt high fall risk. Instructed to use call light before getting out of bed. Call light within reach. Bed in low position. Bed alarm on. Will continue to monitor.

## 2025-02-12 NOTE — PROGRESS NOTES
Subjective   Patient ID: Bhavana Carcamo is a 80 y.o. female is being seen for consultation today at the request of SAI Almanza* 2ND OPINION_SPINAL STENOSIS OF LUMBAR REGION WITH NEUROGENIC CLAUDICATION_XR LSPINE [id number]     This lady is generally healthy but she does have severe arthritis and has had bilateral hip replacements, knee replacements and shoulder replacements.  She has had a long history of low back pain but about 6 months ago it began to get worse and began having some right buttock right hip and anterior thigh pain.  She went to her orthopedic surgeon Dr. Marte ordered a lumbar MRI and it did show spinal stenosis most severely at L2-L3 bilaterally and on the left at L3-L4.  She has very intermittent left leg symptoms.  Most of this is on the right and she describes a history consistent with neurogenic claudication.  Standing and walking tend to make it worse.  Sitting a bit better.  She has a positive shopping cart sign.  She went to see one of the nurse practitioners at the [facility] spine clinic who recommended epidural blocks which I told her I thought was very reasonable.  She wanted to wait until she saw me.  I told her that I endorsed that.  I cannot guarantee that they would work but if they do they serve as an alternative to a surgical approach.  I mentioned gabapentin but she was concerned about the possibility of side effects since her friend takes the medicine and apparently has side effects.  Will hold off on the medicine for now and try at least 1 right L2-L3 transforaminal block and have her come back in about 6 weeks.  If there is benefit we will continue it.  If not then we will consider doing a right-sided L2-L3 decompression.     History of Present Illness    The following portions of the patient's history were reviewed and updated as appropriate: allergies, current medications, past family history, past medical history, past social history, past surgical  history, and problem list.    Review of Systems   Constitutional:  Negative for fever.   Musculoskeletal:  Positive for back pain and gait problem.   Neurological:  Positive for weakness. Negative for numbness.   All other systems reviewed and are negative.      Objective   Physical Exam  Constitutional:       General: She is awake.   Eyes:      General: Lids are normal.      Extraocular Movements: Extraocular movements intact.      Pupils: Pupils are equal, round, and reactive to light.   Neurological:      Mental Status: She is alert.      Coordination: Coordination is intact.      Deep Tendon Reflexes:      Reflex Scores:       Tricep reflexes are 2+ on the right side and 2+ on the left side.       Bicep reflexes are 2+ on the right side and 2+ on the left side.       Brachioradialis reflexes are 2+ on the right side and 2+ on the left side.       Patellar reflexes are 2+ on the right side and 2+ on the left side.       Achilles reflexes are 2+ on the right side and 2+ on the left side.  Psychiatric:         Speech: Speech normal.       Neurological Exam  Mental Status  Awake and alert. Oriented only to person, place, time and situation. Recent and remote memory are intact. Speech is normal. Language is fluent with no aphasia. Attention and concentration are normal. Fund of knowledge is appropriate for level of education.    Cranial Nerves  CN II: Visual acuity is normal. Visual fields full to confrontation.  CN III, IV, VI: Extraocular movements intact bilaterally. Normal lids and orbits bilaterally. Pupils equal round and reactive to light bilaterally.  CN V: Facial sensation is normal.  CN VII: Full and symmetric facial movement.  CN IX, X: Palate elevates symmetrically. Normal gag reflex.  CN XI: Shoulder shrug strength is normal.  CN XII: Tongue midline without atrophy or fasciculations.    Motor  Normal muscle bulk throughout. Normal muscle tone.                                               Right                      Left  Rhomboids                            5                          5  Infraspinatus                          5                          5  Supraspinatus                       5                          5  Deltoid                                   5                          5   Biceps                                   5                          5  Brachioradialis                      5                          5   Triceps                                  5                          5   Pronator                                5                          5   Supinator                              5                           5   Wrist flexor                            5                          5   Wrist extensor                       5                          5   Finger flexor                          5                          5   Finger extensor                     5                          5   Interossei                              5                          5   Abductor pollicis brevis         5                          5   Flexor pollicis brevis             5                          5   Opponens pollicis                 5                          5  Extensor digitorum               5                          5  Abductor digiti minimi           5                          5   Abdominal                            5                          5  Glutei                                    5                          5  Hip abductor                         5                          5  Hip adductor                         5                          5   Iliopsoas                               5                          5   Quadriceps                           5                          5   Hamstring                             5                          5   Gastrocnemius                     5                           5   Anterior tibialis                      5                          5   Posterior tibialis                     5                          5   Peroneal                               5                          5  Ankle dorsiflexor                   5                          5  Ankle plantar flexor              5                           5  Extensor hallucis longus      5                           5    Sensory  Light touch is normal in upper and lower extremities. Proprioception is normal in upper and lower extremities.     Reflexes                                            Right                      Left  Brachioradialis                    2+                         2+  Biceps                                 2+                         2+  Triceps                                2+                         2+  Finger flex                           2+                         2+  Hamstring                            2+                         2+  Patellar                                2+                         2+  Achilles                                2+                         2+    Coordination    Finger-to-nose, rapid alternating movements and heel-to-shin normal bilaterally without dysmetria.    Gait  Casual gait is normal including stance, stride, and arm swing.Normal toe walking. Normal heel walking. Normal tandem gait.       Assessment & Plan   Independent Review of Radiographic Studies:      The lumbar MRI done on 9/13/2024 does show some thoracolumbar scoliosis but no significant finding is spinal stenosis at L2-L3 bilaterally and left-sided L3-L4 spinal stenosis.  Agree with the report    Medical Decision Making:      I encouraged her to try the epidural block in the form of a right L2-L3 transforaminal block which I think is the most symptomatic level.  Will hold off on gabapentin since she is concerned about side effects and have her come back in about 6 weeks.  If it helps we should continue it.  If not then we will need to have a discussion about surgical intervention.  Frankly I think I  unilateral right-sided L2-L3 decompression is reasonable since the other levels on the right are fairly patent    Diagnoses and all orders for this visit:    1. Spinal stenosis of lumbar region with neurogenic claudication (Primary)  -     Epidural Block    2. Lumbar radiculopathy, right  -     Epidural Block    3. Scoliosis of thoracolumbar spine, unspecified scoliosis type  -     Epidural Block      Return in about 6 weeks (around 3/31/2025) for face to face.

## 2025-02-17 ENCOUNTER — OFFICE VISIT (OUTPATIENT)
Dept: NEUROSURGERY | Facility: CLINIC | Age: 80
End: 2025-02-17
Payer: MEDICARE

## 2025-02-17 VITALS
HEIGHT: 63 IN | RESPIRATION RATE: 20 BRPM | DIASTOLIC BLOOD PRESSURE: 70 MMHG | SYSTOLIC BLOOD PRESSURE: 122 MMHG | WEIGHT: 153 LBS | BODY MASS INDEX: 27.11 KG/M2

## 2025-02-17 DIAGNOSIS — M48.062 SPINAL STENOSIS OF LUMBAR REGION WITH NEUROGENIC CLAUDICATION: Primary | ICD-10-CM

## 2025-02-17 DIAGNOSIS — M54.16 LUMBAR RADICULOPATHY, RIGHT: ICD-10-CM

## 2025-02-17 DIAGNOSIS — M41.9 SCOLIOSIS OF THORACOLUMBAR SPINE, UNSPECIFIED SCOLIOSIS TYPE: ICD-10-CM

## 2025-02-17 PROCEDURE — 99204 OFFICE O/P NEW MOD 45 MIN: CPT | Performed by: NEUROLOGICAL SURGERY

## 2025-02-17 PROCEDURE — 1159F MED LIST DOCD IN RCRD: CPT | Performed by: NEUROLOGICAL SURGERY

## 2025-02-17 PROCEDURE — 1160F RVW MEDS BY RX/DR IN RCRD: CPT | Performed by: NEUROLOGICAL SURGERY

## 2025-02-17 NOTE — LETTER
February 17, 2025     Noemi Lopez MD  [address] [place] [address] [address]  [address]  [place] [id number]    Patient: Bhavana Carcamo   YOB: 1945   Date of Visit: 2/17/2025     Dear Noemi Lopez MD:       Thank you for referring Bhavana Carcamo to me for evaluation. Below are the relevant portions of my assessment and plan of care.    If you have questions, please do not hesitate to call me. I look forward to following Bhavana along with you.         Sincerely,        Gurwinder Moeller MD        CC: No Recipients    Gurwinder Moeller MD  02/17/25 2027  Sign when Signing Visit  Subjective  Patient ID: Bhavana Carcamo is a 80 y.o. female is being seen for consultation today at the request of SAI Almanza* 2ND OPINION_SPINAL STENOSIS OF LUMBAR REGION WITH NEUROGENIC CLAUDICATION_XR LSPINE [id number]     This lady is generally healthy but she does have severe arthritis and has had bilateral hip replacements, knee replacements and shoulder replacements.  She has had a long history of low back pain but about 6 months ago it began to get worse and began having some right buttock right hip and anterior thigh pain.  She went to her orthopedic surgeon Dr. Marte ordered a lumbar MRI and it did show spinal stenosis most severely at L2-L3 bilaterally and on the left at L3-L4.  She has very intermittent left leg symptoms.  Most of this is on the right and she describes a history consistent with neurogenic claudication.  Standing and walking tend to make it worse.  Sitting a bit better.  She has a positive shopping cart sign.  She went to see one of the nurse practitioners at the [facility] spine clinic who recommended epidural blocks which I told her I thought was very reasonable.  She wanted to wait until she saw me.  I told her that I endorsed that.  I cannot guarantee that they would work but if they do they serve as an alternative to a surgical approach.  I mentioned gabapentin but she was  concerned about the possibility of side effects since her friend takes the medicine and apparently has side effects.  Will hold off on the medicine for now and try at least 1 right L2-L3 transforaminal block and have her come back in about 6 weeks.  If there is benefit we will continue it.  If not then we will consider doing a right-sided L2-L3 decompression.     History of Present Illness    The following portions of the patient's history were reviewed and updated as appropriate: allergies, current medications, past family history, past medical history, past social history, past surgical history, and problem list.    Review of Systems   Constitutional:  Negative for fever.   Musculoskeletal:  Positive for back pain and gait problem.   Neurological:  Positive for weakness. Negative for numbness.   All other systems reviewed and are negative.      Objective  Physical Exam  Constitutional:       General: She is awake.   Eyes:      General: Lids are normal.      Extraocular Movements: Extraocular movements intact.      Pupils: Pupils are equal, round, and reactive to light.   Neurological:      Mental Status: She is alert.      Coordination: Coordination is intact.      Deep Tendon Reflexes:      Reflex Scores:       Tricep reflexes are 2+ on the right side and 2+ on the left side.       Bicep reflexes are 2+ on the right side and 2+ on the left side.       Brachioradialis reflexes are 2+ on the right side and 2+ on the left side.       Patellar reflexes are 2+ on the right side and 2+ on the left side.       Achilles reflexes are 2+ on the right side and 2+ on the left side.  Psychiatric:         Speech: Speech normal.       Neurological Exam  Mental Status  Awake and alert. Oriented only to person, place, time and situation. Recent and remote memory are intact. Speech is normal. Language is fluent with no aphasia. Attention and concentration are normal. Fund of knowledge is appropriate for level of  education.    Cranial Nerves  CN II: Visual acuity is normal. Visual fields full to confrontation.  CN III, IV, VI: Extraocular movements intact bilaterally. Normal lids and orbits bilaterally. Pupils equal round and reactive to light bilaterally.  CN V: Facial sensation is normal.  CN VII: Full and symmetric facial movement.  CN IX, X: Palate elevates symmetrically. Normal gag reflex.  CN XI: Shoulder shrug strength is normal.  CN XII: Tongue midline without atrophy or fasciculations.    Motor  Normal muscle bulk throughout. Normal muscle tone.                                               Right                     Left  Rhomboids                            5                          5  Infraspinatus                          5                          5  Supraspinatus                       5                          5  Deltoid                                   5                          5   Biceps                                   5                          5  Brachioradialis                      5                          5   Triceps                                  5                          5   Pronator                                5                          5   Supinator                              5                           5   Wrist flexor                            5                          5   Wrist extensor                       5                          5   Finger flexor                          5                          5   Finger extensor                     5                          5   Interossei                              5                          5   Abductor pollicis brevis         5                          5   Flexor pollicis brevis             5                          5   Opponens pollicis                 5                          5  Extensor digitorum               5                          5  Abductor digiti minimi           5                          5   Abdominal                             5                          5  Glutei                                    5                          5  Hip abductor                         5                          5  Hip adductor                         5                          5   Iliopsoas                               5                          5   Quadriceps                           5                          5   Hamstring                             5                          5   Gastrocnemius                     5                           5   Anterior tibialis                      5                          5   Posterior tibialis                    5                          5   Peroneal                               5                          5  Ankle dorsiflexor                   5                          5  Ankle plantar flexor              5                           5  Extensor hallucis longus      5                           5    Sensory  Light touch is normal in upper and lower extremities. Proprioception is normal in upper and lower extremities.     Reflexes                                            Right                      Left  Brachioradialis                    2+                         2+  Biceps                                 2+                         2+  Triceps                                2+                         2+  Finger flex                           2+                         2+  Hamstring                            2+                         2+  Patellar                                2+                         2+  Achilles                                2+                         2+    Coordination    Finger-to-nose, rapid alternating movements and heel-to-shin normal bilaterally without dysmetria.    Gait  Casual gait is normal including stance, stride, and arm swing.Normal toe walking. Normal heel walking. Normal tandem gait.       Assessment & Plan  Independent Review of Radiographic Studies:      The  lumbar MRI done on 9/13/2024 does show some thoracolumbar scoliosis but no significant finding is spinal stenosis at L2-L3 bilaterally and left-sided L3-L4 spinal stenosis.  Agree with the report    Medical Decision Making:      I encouraged her to try the epidural block in the form of a right L2-L3 transforaminal block which I think is the most symptomatic level.  Will hold off on gabapentin since she is concerned about side effects and have her come back in about 6 weeks.  If it helps we should continue it.  If not then we will need to have a discussion about surgical intervention.  Frankly I think I unilateral right-sided L2-L3 decompression is reasonable since the other levels on the right are fairly patent    Diagnoses and all orders for this visit:    1. Spinal stenosis of lumbar region with neurogenic claudication (Primary)  -     Epidural Block    2. Lumbar radiculopathy, right  -     Epidural Block    3. Scoliosis of thoracolumbar spine, unspecified scoliosis type  -     Epidural Block      Return in about 6 weeks (around 3/31/2025) for face to face.

## 2025-02-27 ENCOUNTER — TELEPHONE (OUTPATIENT)
Dept: NEUROSURGERY | Facility: CLINIC | Age: 80
End: 2025-02-27
Payer: MEDICARE

## 2025-02-27 DIAGNOSIS — M54.16 LUMBAR RADICULOPATHY, RIGHT: ICD-10-CM

## 2025-02-27 DIAGNOSIS — M48.062 SPINAL STENOSIS OF LUMBAR REGION WITH NEUROGENIC CLAUDICATION: Primary | ICD-10-CM

## 2025-02-27 DIAGNOSIS — M41.9 SCOLIOSIS OF THORACOLUMBAR SPINE, UNSPECIFIED SCOLIOSIS TYPE: ICD-10-CM

## 2025-02-27 NOTE — TELEPHONE ENCOUNTER
PT CALLED IN ASKING ABOUT AN UPDATE FOR THE EPIDURAL SHOT DR. HARDEN WAS SUPPOSED TO GET HER A REFERRAL AS PER LAST VISIT.    PLEASE ADVISE.    Bhavana Carcamo   [phone]

## 2025-02-27 NOTE — TELEPHONE ENCOUNTER
Referral was no done correct this has been fixed and they should be calling the patient soon, patient is aware

## 2025-03-10 ENCOUNTER — HOSPITAL ENCOUNTER (OUTPATIENT)
Dept: PAIN MEDICINE | Facility: HOSPITAL | Age: 80
Discharge: HOME | End: 2025-03-10
Payer: MEDICARE

## 2025-03-10 ENCOUNTER — ANESTHESIA EVENT (OUTPATIENT)
Dept: PAIN MEDICINE | Facility: HOSPITAL | Age: 80
End: 2025-03-10
Payer: MEDICARE

## 2025-03-10 ENCOUNTER — ANESTHESIA (OUTPATIENT)
Dept: PAIN MEDICINE | Facility: HOSPITAL | Age: 80
End: 2025-03-10
Payer: MEDICARE

## 2025-03-10 ENCOUNTER — HOSPITAL ENCOUNTER (OUTPATIENT)
Dept: GENERAL RADIOLOGY | Facility: HOSPITAL | Age: 80
Discharge: HOME | End: 2025-03-10
Payer: MEDICARE

## 2025-03-10 VITALS
BODY MASS INDEX: 27.42 KG/M2 | TEMPERATURE: 97.8 F | DIASTOLIC BLOOD PRESSURE: 67 MMHG | OXYGEN SATURATION: 92 % | SYSTOLIC BLOOD PRESSURE: 119 MMHG | WEIGHT: 149 LBS | RESPIRATION RATE: 16 BRPM | HEART RATE: 64 BPM | HEIGHT: 62 IN

## 2025-03-10 DIAGNOSIS — R52 PAIN: ICD-10-CM

## 2025-03-10 DIAGNOSIS — M54.16 LUMBAR RADICULOPATHY, RIGHT: Primary | ICD-10-CM

## 2025-03-10 PROCEDURE — 25010000002 DEXAMETHASONE SODIUM PHOSPHATE 10 MG/ML SOLUTION: Performed by: ANESTHESIOLOGY

## 2025-03-10 PROCEDURE — 25010000002 BUPIVACAINE (PF) 0.25 % SOLUTION: Performed by: ANESTHESIOLOGY

## 2025-03-10 PROCEDURE — 25510000001 IOPAMIDOL 41 % SOLUTION: Performed by: ANESTHESIOLOGY

## 2025-03-10 PROCEDURE — 77003 FLUOROGUIDE FOR SPINE INJECT: CPT

## 2025-03-10 RX ORDER — DEXAMETHASONE SODIUM PHOSPHATE 10 MG/ML
10 INJECTION, SOLUTION INTRAMUSCULAR; INTRAVENOUS ONCE
Status: COMPLETED | OUTPATIENT
Start: 2025-03-10 | End: 2025-03-10

## 2025-03-10 RX ORDER — ESTRADIOL 0.1 MG/G
2 CREAM VAGINAL 2 TIMES WEEKLY
COMMUNITY

## 2025-03-10 RX ORDER — ESOMEPRAZOLE MAGNESIUM 40 MG/1
20 GRANULE, DELAYED RELEASE ORAL DAILY
COMMUNITY

## 2025-03-10 RX ORDER — IOPAMIDOL 408 MG/ML
10 INJECTION, SOLUTION INTRATHECAL
Status: COMPLETED | OUTPATIENT
Start: 2025-03-10 | End: 2025-03-10

## 2025-03-10 RX ORDER — FENTANYL CITRATE 50 UG/ML
50 INJECTION, SOLUTION INTRAMUSCULAR; INTRAVENOUS ONCE
Status: DISCONTINUED | OUTPATIENT
Start: 2025-03-10 | End: 2025-03-11 | Stop reason: HOSPADM

## 2025-03-10 RX ORDER — MIDAZOLAM HYDROCHLORIDE 1 MG/ML
1 INJECTION, SOLUTION INTRAMUSCULAR; INTRAVENOUS ONCE AS NEEDED
Status: DISCONTINUED | OUTPATIENT
Start: 2025-03-10 | End: 2025-03-11 | Stop reason: HOSPADM

## 2025-03-10 RX ORDER — BUPIVACAINE HYDROCHLORIDE 2.5 MG/ML
10 INJECTION, SOLUTION EPIDURAL; INFILTRATION; INTRACAUDAL ONCE
Status: COMPLETED | OUTPATIENT
Start: 2025-03-10 | End: 2025-03-10

## 2025-03-10 RX ORDER — LIDOCAINE HYDROCHLORIDE 10 MG/ML
1 INJECTION, SOLUTION INFILTRATION; PERINEURAL ONCE
Status: DISCONTINUED | OUTPATIENT
Start: 2025-03-10 | End: 2025-03-11 | Stop reason: HOSPADM

## 2025-03-10 RX ORDER — FLUTICASONE PROPIONATE 50 MCG
2 SPRAY, SUSPENSION (ML) NASAL DAILY
COMMUNITY
Start: 2025-02-15

## 2025-03-10 RX ADMIN — IOPAMIDOL 10 ML: 408 INJECTION, SOLUTION INTRATHECAL at 09:40

## 2025-03-10 RX ADMIN — BUPIVACAINE HYDROCHLORIDE 10 ML: 2.5 INJECTION, SOLUTION EPIDURAL; INFILTRATION; INTRACAUDAL; PERINEURAL at 09:40

## 2025-03-10 RX ADMIN — DEXAMETHASONE SODIUM PHOSPHATE 10 MG: 10 INJECTION, SOLUTION INTRAMUSCULAR; INTRAVENOUS at 09:40

## 2025-03-10 NOTE — ANESTHESIA PROCEDURE NOTES
Right transforaminal lumbar 2 epidural steroid injection      Patient reassessed immediately prior to procedure    Patient location during procedure: pain clinic  Indication:procedure for pain  Performed By  Anesthesiologist: Jose Rodriguez MD  Preanesthetic Checklist  Completed: patient identified and risks and benefits discussed  Additional Notes  Diagnosis:  Post-Op Diagnosis Codes:     * Lumbar radiculopathy [M54.16]    Sedation:    Sedation time:    Under fluoroscopic guidance, a transforaminal epidural steroid injection was performed.  Confirmation by Isovue dye  Prep:  Pt Position:prone  Sterile Tech:gloves, sterile barrier and mask  Prep:chlorhexidine gluconate and isopropyl alcohol  Monitoring:blood pressure monitoring, continuous pulse oximetry and EKG  Procedure:Sedation: no     Approach:right paramedian  Guidance: fluoroscopy  Location:lumbar  Level:2-3  Epidural needle type: Cristina.  Needle Gauge:22 G  Medications:  Amount (mL): 2 cc.  Comments:Decadron 10 mg PF in Bupivacaine 0.25% PF 2cc  Post Assessment:  Pt Tolerance:patient tolerated the procedure well with no apparent complications  Complications:no

## 2025-03-10 NOTE — DISCHARGE INSTRUCTIONS
EPIDURAL STEROID INJECTION          An epidural steroid injection is a shot of steroid medicine and numbing medicine that is given into the space between the spinal cord and the bones of the back (epidural space).  The injection helps relieve pain by an irritated or swollen nerve root.    TELL YOUR HEALTH CARE PROVIDER ABOUT:  Any allergies you have  All medicines you are taking including any over the counter medicines  Any blood disorders you have  Any surgeries you have had  Any medical conditions you have  Whether you are pregnant or may be pregnant    WHAT ARE THE RISK?  Generally, this is a safe procedure. However,problems may occur, including  Headache  Bleeding  Infection  Allergic Reaction  Nerve Damage    WHAT CAN I EXPECT AFTER THE PROCEDURE?    INJECTION SITE  Remove the Band-Aid/s after 24 hours  Check your injection site every day for signs of infection.  Check for:             Redness             Bleeding (small amt is normal)             Warmth             Pus or bad odor  Some numbness may be experienced for several hours following the procedure.  Avoid using heat on the injection site for 24 hours. You may use ice intermittently if needed by placing a         towel between your skin and the ice bag and using the ice for 20 minutes 2-3 times a day.  Do not take baths, swim or use a hot tub for 24 hours.    ACTIVITY  No strenuous activity for 24 hours then return to normal activity as tolerated.  If your leg is numb, no driving until full sensation and strength has returned.    GENERAL INSTRUCTIONS:  The injection site may feel numb, use ice with caution if numbness is present and no heat for 24 hours or until numbness is gone.   If you have numbness or weakness in your arm or leg, use those areas with caution until normal sensation returns.  It is not uncommon to notice an increase in discomfort or a change in the location of discomfort for 3-4 days after the procedure.  If discomfort is noticed  at the injection site, ice may be            applied to that area for 20 min 2-3 times a day.  Take the pain medicine your physician has prescribed or over the counter pain relievers as long as you do not have any contraindications.  If you are a diabetic, monitor your blood sugar closely.  The steroids used in your procedure may increase your blood sugar level up to 36 hours after the injection.  If your blood sugar is greater than 250, call the physician that helps you monitor your blood sugar.  Keep all follow-up visits as scheduled by your health care provider. This is important.    CONTACT OUR OFFICE IF:  You have any of these signs of infection            -Redness, swelling, or warmth around your injection site.            -Fluid or blood coming from your injection site (small amt of blood is normal)            -Pus or a bad odor from your injection site            -A fever  You develop a severe headache or a stiff neck  You lose control of your bladder or bowel movements      PAIN MANAGEMENT CENTER HOURS   Monday-Friday 7:30 am. - 4:00 pm.  For any problem related to your procedure we can be reached at [phone].  If you experience an emergency with your procedure, call [phone] or go to the emergency room.    Back Exercises  These exercises help to make your trunk and back strong. They also help to keep the lower back flexible. Doing these exercises can help to prevent or lessen pain in your lower back.  If you have back pain, try to do these exercises 2-3 times each day or as told by your doctor.  As you get better, do the exercises once each day. Repeat the exercises more often as told by your doctor.  To stop back pain from coming back, do the exercises once each day, or as told by your doctor.  Do exercises exactly as told by your doctor. Stop right away if you feel sudden pain or your pain gets worse.  Exercises  Single knee to chest  Do these steps 3-5 times in a row for each leg:  Lie on your  back on a firm bed or the floor with your legs stretched out.  Bring one knee to your chest.  Grab your knee or thigh with both hands and hold it in place.  Pull on your knee until you feel a gentle stretch in your lower back or butt.  Keep doing the stretch for 10-30 seconds.  Slowly let go of your leg and straighten it.  Pelvic tilt  Do these steps 5-10 times in a row:  Lie on your back on a firm bed or the floor with your legs stretched out.  Bend your knees so they point up to the ceiling. Your feet should be flat on the floor.  Tighten your lower belly (abdomen) muscles to press your lower back against the floor. This will make your tailbone point up to the ceiling instead of pointing down to your feet or the floor.  Stay in this position for 5-10 seconds while you gently tighten your muscles and breathe evenly.  Cat-cow  Do these steps until your lower back bends more easily:  Get on your hands and knees on a firm bed or the floor. Keep your hands under your shoulders, and keep your knees under your hips. You may put padding under your knees.  Let your head hang down toward your chest. Tighten (contract) the muscles in your belly. Point your tailbone toward the floor so your lower back becomes rounded like the back of a cat.  Stay in this position for 5 seconds.  Slowly lift your head. Let the muscles of your belly relax. Point your tailbone up toward the ceiling so your back forms a sagging arch like the back of a cow.  Stay in this position for 5 seconds.     Press-ups  Do these steps 5-10 times in a row:  Lie on your belly (face-down) on a firm bed or the floor.  Place your hands near your head, about shoulder-width apart.  While you keep your back relaxed and keep your hips on the floor, slowly straighten your arms to raise the top half of your body and lift your shoulders. Do not use your back muscles. You may change where you place your hands to make yourself more comfortable.  Stay in this position for  5 seconds. Keep your back relaxed.  Slowly return to lying flat on the floor.     Bridges  Do these steps 10 times in a row:  Lie on your back on a firm bed or the floor.  Bend your knees so they point up to the ceiling. Your feet should be flat on the floor. Your arms should be flat at your sides, next to your body.  Tighten your butt muscles and lift your butt off the floor until your waist is almost as high as your knees. If you do not feel the muscles working in your butt and the back of your thighs, slide your feet 1-2 inches (2.5-5 cm) farther away from your butt.  Stay in this position for 3-5 seconds.  Slowly lower your butt to the floor, and let your butt muscles relax.  If this exercise is too easy, try doing it with your arms crossed over your chest.  Belly crunches  Do these steps 5-10 times in a row:  Lie on your back on a firm bed or the floor with your legs stretched out.  Bend your knees so they point up to the ceiling. Your feet should be flat on the floor.  Cross your arms over your chest.  Tip your chin a little bit toward your chest, but do not bend your neck.  Tighten your belly muscles and slowly raise your chest just enough to lift your shoulder blades a tiny bit off the floor. Avoid raising your body higher than that because it can put too much stress on your lower back.  Slowly lower your chest and your head to the floor.  Back lifts  Do these steps 5-10 times in a row:  Lie on your belly (face-down) with your arms at your sides, and rest your forehead on the floor.  Tighten the muscles in your legs and your butt.  Slowly lift your chest off the floor while you keep your hips on the floor. Keep the back of your head in line with the curve in your back. Look at the floor while you do this.  Stay in this position for 3-5 seconds.  Slowly lower your chest and your face to the floor.  Contact a doctor if:  Your back pain gets a lot worse when you do an exercise.  Your back pain does not get  better within 2 hours after you exercise.  If you have any of these problems, stop doing the exercises. Do not do them again unless your doctor says it is okay.  Get help right away if:  You have sudden, very bad back pain. If this happens, stop doing the exercises. Do not do them again unless your doctor says it is okay.  This information is not intended to replace advice given to you by your health care provider. Make sure you discuss any questions you have with your health care provider.  Document Revised: 03/02/2022 Document Reviewed: 03/02/2022  [employer] Patient Education © 2024 [employer]    What is [employer]?  [employer] is a fitness and wellness program offered at no additional cost to seniors 65+ on eligible Medicare plans that helps you get active, get fit, and connect with others.  The program is designed for all levels and abilities and provides access to online and in-person classes, over 15,000 fitness locations, and health & wellness discounts.  Before starting any exercise program, consult with your primary care provider.  For additional information and to check eligibility:  [employer]

## 2025-03-10 NOTE — H&P
CHIEF COMPLAINT: Right buttock and hip pain      HISTORY OF PRESENT ILLNESS:  She does not have a history of spinal surgery but had come in with complaints of right buttock and hip pain.  Lumbar MRI shows spinal stenosis.  She was referred for a right transforaminal lumbar to epidural steroid injection    PAST MEDICAL HISTORY:  Current Outpatient Medications on File Prior to Encounter   Medication Sig Dispense Refill    ascorbic acid with jose luis hips 1000 MG tablet Take 1 tablet by mouth.      cetirizine (ZyrTEC) 10 MG tablet Take 1 tablet by mouth Daily.      cetirizine (zyrTEC) 10 MG tablet Take 1 tablet by mouth.      Coenzyme Q10 60 MG capsule Take  by mouth.      Cyanocobalamin (B-12) 1000 MCG capsule Take  by mouth.      Fish Oil-Cholecalciferol (FISH OIL + D3) 1336-0478 MG-UNIT capsule Take 2 g by mouth.      FLUoxetine (PROzac) 10 MG capsule Take 1 capsule by mouth Daily.      folic acid (FOLVITE) 1 MG tablet Take 1 tablet by mouth Daily.      Multiple Vitamin (MULTIVITAMIN) tablet Take 1 tablet by mouth Daily.      PROBIOTIC PRODUCT PO Take  by mouth.      TURMERIC PO Take  by mouth Daily.      Vitamin C-Vitamin E-Panthenol (VITAMIN E & C BEAUTY LOTION EX) Take 800 Units by mouth.      VITAMIN K, PHYTONADIONE, PO Take 100 mcg by mouth.       No current facility-administered medications on file prior to encounter.       Past Medical History:   Diagnosis Date    Allergy     Colon polyp     GERD (gastroesophageal reflux disease)     H/O complete eye exam DUE    Hip pain     Hyperlipidemia     Knee pain     Low back pain     right    Neck pain     Osteoarthritis     Scoliosis (and kyphoscoliosis), idiopathic     Urinary frequency          SOCIAL HISTORY:  No tobacco    REVIEW OF SYSTEMS:  No hematologic infectious or constitutional symptoms  Other review of systems non-contributory  Negative screen for MARTA      PHYSICAL EXAM:  There were no vitals taken for this visit.  Well-developed well-nourished no acute  distress  Extra ocular movements intact  Mallampati class 2 airway  Alert and oriented ×3    DIAGNOSIS:  Post-Op Diagnosis Codes:     * Lumbar radiculopathy [M54.16]    PLAN:  1.  Right transforaminal lumbar 2 epidural steroid injections, up to 3. If pain control is acceptable after 1 or 2 injections, it was discussed with the patient that they may return for the subsequent injections if and when their pain returns.  The risks were discussed with the patient including failure of relief, worsening pain, Headache (post dural puncture headache), bleeding (epidural hematoma) and infection (epidural abscess or skin infection).  2.  Physical therapy exercises at home as prescribed by physical therapy or from the pain clinic handout.  Continuation of these exercises every day, or multiple times per week, even when the patient has good pain relief, was stressed to the patient as a preventative measure to decrease the frequency and severity of future pain episodes.  3.  Continue pain medicines as already prescribed.  If patient not currently taking any, it is recommended to begin Acetaminophen 1000 mg po q 8 hours.  If other medicines containing Acetaminophen are currently prescribed, maintain daily dose at 3000 mg.    4.  If they can tolerate NSAIDS, it is recommended to take Ibuprofen 600 mg po q 6 hours for 7 days during pain exacerbations.  Alternatively, they may substitute an NSAID of their choice (e.g. Aleve).  This may be taken at the same time as Acetaminophen.  5.  Heat and ice to the affected area as tolerated for pain control.    6.  Daily low impact exercise such as walking or water exercise was recommended to maintain overall health and aid in weight control.   7.  Follow up as needed for subsequent injections.

## 2025-04-09 ENCOUNTER — TELEPHONE (OUTPATIENT)
Dept: NEUROSURGERY | Facility: CLINIC | Age: 80
End: 2025-04-09
Payer: MEDICARE

## 2025-04-09 NOTE — TELEPHONE ENCOUNTER
Provider: DR HARDEN     Caller: CRISTAL FRY    Relationship to Patient: SELF      Phone Number: [phone]    Reason for Call: PT HAD AN INJECTION 3-10-25.  SHE IS ASKING IF SHE IS SUPPOSED TO FOLLOW UP WITH [facility] OR IF SHE NEEDS TO GET IN WITH SOME PAIN CLINIC.      SHE CANCELED HER APPT IN OUR OFFICE, 4-14-25.    PLEASE ADVISE.    When was the patient last seen: 2-17-25

## 2025-04-10 NOTE — TELEPHONE ENCOUNTER
I spoke w/ patient and she informed me that she canceled her appointment w/ Dr. Moeller due to her feeling the need to be seen else where. She is established w/ Dr. Jose Rodriguez and I gave her his number to discuss further actions w/ their office. Patient voiced understanding.

## (undated) DEVICE — FLEX ADVANTAGE 1500CC: Brand: FLEX ADVANTAGE

## (undated) DEVICE — CANN NASL CO2 TRULINK W/O2 A/

## (undated) DEVICE — VIAL FORMLN CAP 10PCT 20ML

## (undated) DEVICE — KT ORCA ORCAPOD DISP STRL

## (undated) DEVICE — SINGLE-USE BIOPSY FORCEPS: Brand: RADIAL JAW 4

## (undated) DEVICE — Device

## (undated) DEVICE — JACKT LAB F/R KNIT CUFF/COLR XLG BLU